# Patient Record
Sex: MALE | Race: WHITE | NOT HISPANIC OR LATINO | Employment: FULL TIME | URBAN - METROPOLITAN AREA
[De-identification: names, ages, dates, MRNs, and addresses within clinical notes are randomized per-mention and may not be internally consistent; named-entity substitution may affect disease eponyms.]

---

## 2023-03-13 ENCOUNTER — APPOINTMENT (OUTPATIENT)
Dept: GENERAL RADIOLOGY | Facility: CLINIC | Age: 38
End: 2023-03-13
Attending: PHYSICIAN ASSISTANT
Payer: OTHER MISCELLANEOUS

## 2023-03-13 ENCOUNTER — HOSPITAL ENCOUNTER (EMERGENCY)
Facility: CLINIC | Age: 38
Discharge: HOME | End: 2023-03-13
Attending: PHYSICIAN ASSISTANT | Admitting: PHYSICIAN ASSISTANT
Payer: OTHER MISCELLANEOUS

## 2023-03-13 VITALS
OXYGEN SATURATION: 98 % | HEART RATE: 50 BPM | RESPIRATION RATE: 20 BRPM | TEMPERATURE: 98.3 F | SYSTOLIC BLOOD PRESSURE: 149 MMHG | DIASTOLIC BLOOD PRESSURE: 94 MMHG

## 2023-03-13 DIAGNOSIS — S61.011A LACERATION OF RIGHT THUMB WITHOUT FOREIGN BODY WITHOUT DAMAGE TO NAIL, INITIAL ENCOUNTER: ICD-10-CM

## 2023-03-13 DIAGNOSIS — S62.639B: ICD-10-CM

## 2023-03-13 PROCEDURE — 250N000011 HC RX IP 250 OP 636: Performed by: PHYSICIAN ASSISTANT

## 2023-03-13 PROCEDURE — 12002 RPR S/N/AX/GEN/TRNK2.6-7.5CM: CPT | Mod: 59

## 2023-03-13 PROCEDURE — 90471 IMMUNIZATION ADMIN: CPT | Performed by: PHYSICIAN ASSISTANT

## 2023-03-13 PROCEDURE — 90715 TDAP VACCINE 7 YRS/> IM: CPT | Performed by: PHYSICIAN ASSISTANT

## 2023-03-13 PROCEDURE — 99284 EMERGENCY DEPT VISIT MOD MDM: CPT | Mod: 25

## 2023-03-13 PROCEDURE — 26750 TREAT FINGER FRACTURE EACH: CPT | Mod: F5

## 2023-03-13 PROCEDURE — 73140 X-RAY EXAM OF FINGER(S): CPT | Mod: RT

## 2023-03-13 RX ORDER — CEPHALEXIN 500 MG/1
500 CAPSULE ORAL 4 TIMES DAILY
Qty: 20 CAPSULE | Refills: 0 | Status: SHIPPED | OUTPATIENT
Start: 2023-03-13 | End: 2023-03-18

## 2023-03-13 RX ADMIN — CLOSTRIDIUM TETANI TOXOID ANTIGEN (FORMALDEHYDE INACTIVATED), CORYNEBACTERIUM DIPHTHERIAE TOXOID ANTIGEN (FORMALDEHYDE INACTIVATED), BORDETELLA PERTUSSIS TOXOID ANTIGEN (GLUTARALDEHYDE INACTIVATED), BORDETELLA PERTUSSIS FILAMENTOUS HEMAGGLUTININ ANTIGEN (FORMALDEHYDE INACTIVATED), BORDETELLA PERTUSSIS PERTACTIN ANTIGEN, AND BORDETELLA PERTUSSIS FIMBRIAE 2/3 ANTIGEN 0.5 ML: 5; 2; 2.5; 5; 3; 5 INJECTION, SUSPENSION INTRAMUSCULAR at 09:57

## 2023-03-13 ASSESSMENT — ACTIVITIES OF DAILY LIVING (ADL)
ADLS_ACUITY_SCORE: 35
ADLS_ACUITY_SCORE: 35

## 2023-03-13 ASSESSMENT — ENCOUNTER SYMPTOMS
NUMBNESS: 0
WOUND: 1

## 2023-03-13 NOTE — ED PROVIDER NOTES
History     Chief Complaint:  Laceration (/)     The history is provided by the patient.      Mick Carrasco is a 38 year old male right hand dominant who presents with right hand laceration. About 45 minutes prior to arrival he was using a tablesaw while at work. His glove got caught on the blade and cut his right hand. He denies any numbness or tingling. His Tetanus is from 2005.    Independent Historian:   None - Patient Only    Review of External Notes:   None    ROS:  Review of Systems   Skin: Positive for wound.   Neurological: Negative for numbness.   All other systems reviewed and are negative.    Allergies:  Seasonal Allergies     Medications:    There are no current prescribed medications.     Past Medical History:    The patient denies past medical history.     Past Surgical History:    Toe surgery      Family History:    Father: heart disease    Social History:  He reports to the ED with his coworker, Kenny.   PCP: David Vergara     Physical Exam     Patient Vitals for the past 24 hrs:   BP Temp Temp src Pulse Resp SpO2   03/13/23 0834 (!) 149/94 98.3  F (36.8  C) Temporal 50 20 98 %     Physical Exam  HEENT: mmm  Eyes: PERRL B/L  Neck: Supple  CV: Peripheral pulses intact and regular  Resp: Speaking in full sentences without any respiratory distress  Ext:  Right hand  4cm laceration to pad of 1st digit, no active bleeding  No bony TTP.  Full ROM of fingers  Sensation intact distally.  <2 sec cap refill to all digits  Remainder of the skeletal survey is unremarkable  Skin: warm dry well perfused. See above.  Neuro: Alert  Nursing notes and vital signs reviewed.    Emergency Department Course     Imaging:   XR Finger Right G/E 2 Views   Final Result   IMPRESSION: Nondisplaced linear defect/fracture in the first distal   phalanx which extends from the tuft to the base. Surrounding soft   tissue swelling and skin laceration. There is normal joint spacing and   alignment.      JOHN GRIFFITH MD             SYSTEM ID:  [id number]        Report by: Radiology    Procedures     Laceration Repair      Procedure: Laceration Repair    Indication: Laceration    Consent: Verbal    Location: Right R first (thumb) finger    Length: 4 cm    Preparation: Irrigation with Tap Water.    Anesthesia/Sedation: Lidocaine - 1%      Treatment/Exploration: Wound explored, no foreign bodies found     Closure: The wound was closed with one layer. Skin/superficial layer was closed with 8 x 4-0 Nylon using Interrupted sutures.     Patient Status: The patient tolerated the procedure well: Yes. There were no complications.    Emergency Department Course & Assessments:    Interventions:  0957 Adacel 0.5 mL IM     Assessments:  0900 I obtained history and examined the patient as noted above. I administered digital block at this time.   0915 Laceration repair performed.   1000 I rechecked the patient and explained findings.     Independent Interpretation (X-rays, CTs, rhythm strip):  I independently read finger XR. Obvious bony abnormality noted.    Consultations/Discussion of Management or Tests:  None     Social Determinants of Health affecting care:   None    Disposition:  The patient was discharged to home.     Impression & Plan      Medical Decision Making:  Mick Carrasco is a 38 year old male who presents to ED after sustaining a finger wound to his right thumb while using a table saw at work.  See HPI as above for additional details.  Vitals and physical exam as above.  Imaging suggestive for bony involvement.  Wound repaired as above.  Tetanus updated. Splint applied. Given bony involvement, will send patient home with antibiotics as below.  Referral provided for hand specialist.  [place] patient was safe for discharge to home.    Diagnosis:    ICD-10-CM    1. Laceration of right thumb without foreign body without damage to nail, initial encounter  S61.011A       2. Open fracture of distal phalanx of right hand  S62.639B             Discharge Medications:  New Prescriptions    CEPHALEXIN (KEFLEX) 500 MG CAPSULE    Take 1 capsule (500 mg) by mouth 4 times daily for 5 days      Scribe Disclosure:   I, SHANE KINNEY, am serving as a scribe; to document services personally performed by Artem Bryant PA-C  based on data collection and the provider's statements to me.     3/13/2023   Artem Bryant PA-C     This record was created at least in part using electronic voice recognition software, so please excuse any typographical errors.       Artem Bryant PA-C  03/13/23 1321

## 2023-03-13 NOTE — DISCHARGE INSTRUCTIONS
Keep sutures clean and covered with topical antibiotics and bandage.  Sutures need to be removed in 10-14 days by primary doctor.    Discharge Instructions  Laceration (Cut)    You were seen today for a laceration (cut).  Your provider examined your laceration for any problems such a buried foreign body (like glass, a splinter, or gravel), or injury to blood vessels, tendons, and nerves.  Your provider may have also rinsed and/or scrubbed your laceration to help prevent an infection. It may not be possible to find all problems with your laceration on the first visit; occasionally foreign bodies or a tendon injury can go undetected.    Your laceration may have been closed in one of several ways:  No closure: many wounds will heal just fine without closure.  Stitches: regular stitches that require removal.  Staples: skin staples are often used in the scalp/head.  Wound adhesive (glue): skin glue can be used for certain lacerations and doesn t require removal.  Wound strips (aka Butterfly bandages or steri-strips): these are bandages that help to close a wound.  Absorbable stitches:  dissolving  stitches that go away on their own and usually don t require removal.    A small percentage of wounds will develop an infection regardless of how well the wound is cared for. Antibiotics are generally not indicated to prevent an infection so are only given for a small number of high-risk wounds. Some lacerations are too high risk to close, and are left open to heal because closure can increase the likelihood that an infection will develop.    Remember that all lacerations, no matter how expertly repaired, will cause scarring. We consider many factors, techniques, and materials, in our efforts to provide the best possible cosmetic outcome.    Generally, every Emergency Department visit should have a follow-up clinic visit with either a primary or a specialty clinic/provider. Please follow-up as instructed by your emergency  provider today.     Return to the Emergency Department right away if:  You have more redness, swelling, pain, drainage (pus), a bad smell, or red streaking from your laceration as these symptoms could indicate an infection.  You have a fever of 100.4 F or more.  You have bleeding that you cannot stop at home. If your cut starts to bleed, hold pressure on the bleeding area with a clean cloth or put pressure over the bandage.  If the bleeding does not stop after using constant pressure for 30 minutes, you should return to the Emergency Department for further treatment.  An area past the laceration is cool, pale, or blue compared with the other side, or has a slower return of color when squeezed.  Your dressing seems too tight or starts to get uncomfortable or painful. For children, signs of a problem might be irritability or restlessness.  You have loss of normal function or use of an area, such as being unable to straighten or bend a finger normally.  You have a numb area past the laceration.    Return to the Emergency Department or see your regular provider if:  The laceration starts to come open.   You have something coming out of the cut or a feeling that there is something in the laceration.  Your wound will not heal, or keeps breaking open. There can always be glass, wood, dirt or other things in any wound.  They will not always show up, even on x-rays.  If a wound does not heal, this may be why, and it is important to follow-up with your regular provider.    Home Care:  Take your dressing off in 12-24 hours, or as instructed by your provider, to check your laceration. Remove the dressing sooner if it seems too tight or painful, or if it is getting numb, tingly, or pale past the dressing.  Gently wash your laceration 1-2 times daily with clean water and mild soap. It is okay to shower or run clean water over the laceration, but do not let the laceration soak in water (no swimming).  If your laceration was closed  with wound adhesive or strips: pat it dry and leave it open to the air. For all other repairs: after you wash your laceration, or at least 2 times a day, apply antibiotic ointment (such as Neosporin  or Bacitracin ) to the laceration, then cover it with a Band-Aid  or gauze.  Keep the laceration clean. Wear gloves or other protective clothing if you are around dirt.    Follow-up for removal:  If your wound was closed with staples or regular stitches, they need to be removed according to the instructions and timeline specified by your provider today.  If your wound was closed with absorbable ( dissolving ) sutures, they should fall out, dissolve, or not be visible in about one week. If they are still visible, then they should be removed according to the instructions and timeline specified by your provider today.    Scars:  To help minimize scarring:  Wear sunscreen over the healed laceration when out in the sun.  Massage the area regularly once healed.  You may apply Vitamin E to the healed wound.  Wait. Scars improve in appearance over months and years.    If you were given a prescription for medicine here today, be sure to read all of the information (including the package insert) that comes with your prescription.  This will include important information about the medicine, its side effects, and any warnings that you need to know about.  The pharmacist who fills the prescription can provide more information and answer questions you may have about the medicine.  If you have questions or concerns that the pharmacist cannot address, please call or return to the Emergency Department.       Remember that you can always come back to the Emergency Department if you are not able to see your regular provider in the amount of time listed above, if you get any new symptoms, or if there is anything that worries you.

## 2023-03-20 ENCOUNTER — NURSE TRIAGE (OUTPATIENT)
Dept: NURSING | Facility: CLINIC | Age: 38
End: 2023-03-20
Payer: COMMERCIAL

## 2023-03-20 NOTE — TELEPHONE ENCOUNTER
Patient was seen in ED on Monday and had 8 stiches on his thumb.  Patient states he has been keeping it wrapped and not letting any water get on it, will cover during showers.    Patient states the center of the incision will sometimes ooze clear/bloody like fluid.  The incision is not red, there is no pain and no fever.  It is not currently ozzing anything.    Per the discharge instructions it says: to wash it with soap and water, let dry, apply antibiotic ointment.  Patient has not been doing that.  Patient has only replaced dressing.    Plan made:  Take dressing off and wash with soap and water, when home and the incision will not touch anything then ok to leave dressing off and have it open to air.  When the patient goes to work, or goes to sleep and covers it apply some bacitracin ointment to the incision and cover.  Patient has an appointment to remove the stitches on Wednesday.    Advised patient to call back if ozzing looks like pus, redness, tenderness or develops a fever.    Jenna Lopez RN   03/20/23 6:05 PM  [facility] Nurse Advisor    Reason for Disposition    [1] Clear or blood-tinged fluid draining from wound AND [2] no fever    Additional Information    Negative: [1] Major abdominal surgical incision AND [2] wound gaping open AND [3] visible internal organs    Negative: Sounds like a life-threatening emergency to the triager    Negative: Patient has a Negative Pressure Wound Therapy device    Negative: Patient is followed by a wound clinic or wound specialist for this wound    Negative: [1] Bleeding from incision AND [2] won't stop after 10 minutes of direct pressure    Negative: [1] Bleeding (more than a few drops) from incision AND [2] blood vessel surgery (e.g., carotid endarterectomy, femoral bypass graft, kidney dialysis fistula, tracheostomy)    Negative: [1] Widespread rash AND [2] bright red, sunburn-like    Negative: Severe pain in the incision    Negative: [1] Incision gaping  open AND [2] < 48 hours since wound re-opened    Negative: [1] Incision gaping open AND [2] length of opening > 2 inches (5 cm)    Negative: Patient sounds very sick or weak to the triager    Negative: Sounds like a serious complication to the triager    Negative: Fever > 100.4 F (38.0 C)    Negative: [1] Incision looks infected (spreading redness, pain) AND [2] fever > 99.5 F (37.5 C)    Negative: [1] Incision looks infected (spreading redness, pain) AND [2] large red area (> 2 in. or 5 cm)    Negative: [1] Incision looks infected (spreading redness, pain) AND [2] face wound    Negative: [1] Red streak runs from the incision AND [2] longer than 1 inch (2.5 cm)    Negative: [1] Pus or bad-smelling fluid draining from incision AND [2] no fever    Negative: [1] Post-op pain AND [2] not controlled with pain medications    Negative: Dressing soaked with blood or body fluid (e.g., drainage)    Negative: [1] Scant bleeding (e.g., few drops) from incision AND [2] blood vessel surgery (e.g., carotid endarterectomy, femoral bypass graft, kidney dialysis fistula    Negative: [1] Raised bruise and [2] size > 2 inches (5 cm) and expanding    Negative: [1] Caller has URGENT question AND [2] triager unable to answer question    Negative: [1] INCREASING pain in incision AND [2] > 2 days (48 hours) since surgery    Negative: [1] Small red area or streak AND [2] no fever    Protocols used: POST-OP INCISION SYMPTOMS AND [id number]

## 2024-01-07 ENCOUNTER — HEALTH MAINTENANCE LETTER (OUTPATIENT)
Age: 39
End: 2024-01-07

## 2025-01-25 ENCOUNTER — HEALTH MAINTENANCE LETTER (OUTPATIENT)
Age: 40
End: 2025-01-25

## (undated) RX ORDER — LIDOCAINE HYDROCHLORIDE 10 MG/ML
INJECTION, SOLUTION EPIDURAL; INFILTRATION; INTRACAUDAL; PERINEURAL
Status: DISPENSED
Start: 2023-03-13